# Patient Record
Sex: MALE | Race: WHITE
[De-identification: names, ages, dates, MRNs, and addresses within clinical notes are randomized per-mention and may not be internally consistent; named-entity substitution may affect disease eponyms.]

---

## 2019-04-18 ENCOUNTER — HOSPITAL ENCOUNTER (EMERGENCY)
Dept: HOSPITAL 80 - CED | Age: 34
Discharge: HOME | End: 2019-04-18
Payer: COMMERCIAL

## 2019-04-18 VITALS — DIASTOLIC BLOOD PRESSURE: 84 MMHG | SYSTOLIC BLOOD PRESSURE: 120 MMHG

## 2019-04-18 DIAGNOSIS — R10.12: Primary | ICD-10-CM

## 2019-04-18 NOTE — EDPHY
H & P


Stated Complaint: LUQ abd pain since yesterday, worse today


Time Seen by Provider: 04/18/19 08:44


HPI/ROS: 





CHIEF COMPLAINT:  Left upper quadrant pain





HISTORY OF PRESENT ILLNESS:  Patient is a 34-year-old smoker who comes to the 

emergency department complaining of left upper abdominal pain that began 

yesterday.  He states that it is worse when he eats or burps.  He has also 

noticed some pain with deep inspiration.  He does not feel short of breath.  He 

denies chest pain.  No recent fevers or illness.  No recent travel.  No leg 

swelling or pain.  He does have a history of left inguinal hernia repair as a 

child but has not had any other abdominal surgeries.  He had a bowel movement 

yesterday and today they were normal in its not feel constipated.  No vomiting.

  No diarrhea.  No blood in his stool.  He tried taking Tums yesterday without 

significant relief.  No fever.


Severity:  Moderate


Modifying factors:  See above





REVIEW OF SYSTEMS:


Constitutional:  denies: chills, fever, recent illness, recent injury


EENTM: denies: blurred vision, double vision, nose congestion


Respiratory: denies: cough, shortness of breath


Cardiac: denies: chest pain, irregular heart rate, lightheadedness, palpitations


Gastrointestinal/Abdominal:  See HPI


Genitourinary: denies: dysuria, frequency, hematuria, pain


Musculoskeletal: denies: joint pain, muscle pain


Skin: denies: lesions, rash, jaundice, bruising


Neurological: denies: headache, numbness, paresthesia, tingling, dizziness, 

weakness


Hematologic/Lymphatic: denies: blood clots, easy bleeding, easy bruising


Immunologic/allergic: denies: HIV/AIDS, transplant


 10 systems reviewed and negative except as noted





EXAM:


GENERAL:  Well-appearing, well-nourished and in no acute distress.


HEAD:  Atraumatic, normocephalic.


EYES:  Pupils equal round and reactive to light, extraocular movements intact, 

sclera anicteric, conjunctiva are normal.


ENT:  TMs normal, nares patent, oropharynx clear without exudates.  Moist 

mucous membranes.


NECK:  Normal range of motion, supple without lymphadenopathy or JVD.


LUNGS:  Breath sounds clear to auscultation bilaterally and equal.  No wheezes 

rales or rhonchi.


HEART:  Regular rate and rhythm without murmurs, rubs or gallops.


ABDOMEN:  Soft, nontender, normoactive bowel sounds.  No guarding, no rebound.  

No masses appreciated. 


BACK:  No CVA tenderness, no spinal tenderness, step-offs or deformities


EXTREMITIES:  Normal range of motion, no pitting or edema.  No clubbing or 

cyanosis.


NEUROLOGICAL:  Cranial nerves II through XII grossly intact.  Normal speech, 

normal gait.  5/5 strength, normal movement in all extremities, normal sensation

, normal reflexes


PSYCH:  Normal mood, normal affect.


SKIN:  Warm, dry, normal turgor, no visible rashes or lesions.








Source: Patient


Exam Limitations: No limitations





- Personal History


Current Tetanus Diphtheria and Acellular Pertussis (TDAP): Unsure





- Medical/Surgical History


Hx Asthma: No


Hx Chronic Respiratory Disease: No


Hx Diabetes: No


Hx Cardiac Disease: No


Hx Renal Disease: No


Hx Cirrhosis: No


Hx Alcoholism: No


Hx HIV/AIDS: No


Hx Splenectomy or Spleen Trauma: No


Other PMH: hernia surg





- Family History


Significant Family History: No pertinent family hx





- Social History


Smoking Status: Current every day smoker


Alcohol Use: Sober


Constitutional: 


 Initial Vital Signs











Temperature (C)  37.2 C   04/18/19 08:45


 


Heart Rate  71   04/18/19 08:45


 


Respiratory Rate  16   04/18/19 08:45


 


Blood Pressure  131/83 H  04/18/19 08:45


 


O2 Sat (%)  95   04/18/19 08:45








 











O2 Delivery Mode               Room Air














Allergies/Adverse Reactions: 


 





erythromycin base Allergy (Verified 04/18/19 08:58)


 








Home Medications: 














 Medication  Instructions  Recorded


 


Concerta  04/18/19


 


Famotidine [Pepcid] 40 mg PO HS #30 tablet 04/18/19


 


PARoxetine HCL  04/18/19














Medical Decision Making





- Diagnostics


EKG Interpretation: 





An EKG obtained and was read and documented in trace view.  Please see trace 

view for full reading and report.  Sinus rhythm, incomplete right bundle 

consistent with thin chest wall.  No acute ischemic changes  


Imaging Results: 


 Imaging Impressions





Chest X-Ray  04/18/19 08:50


Impression: Clear lungs. No acute process.











Imaging: Discussed imaging studies w/ On call Radiologist


ED Course/Re-evaluation: 





The patient's EKG, troponin, D-dimer, LFTs and lab workup reassuring.  He 

initially declined x-ray imaging but is now consenting.  Symptoms have improved.





9:45 a.m..  Patient and wife feel reassured.  Will start on Pepcid and have 

follow-up with GI possibly for upper endoscopy.  Discussed indications for 

return to the ER.  Abdominal exam remains benign.


Differential Diagnosis: 





Partial list of the Differential diagnosis considered include but were not 

limited to;  gastroenteritis, peptic ulcer disease and although unlikely based 

on the history and physical exam, I also considered PE, acute coronary disease, 

pneumonia, trauma, appendicitis, biliary disease.  I discussed these 

differential diagnoses and the plan with the patient as well as the usual and 

expected course.  The patient understands that the diagnosis is provisional and 

that in medicine we are not always correct and that further workup is often 

warranted.  Usual and customary warnings were given.  All of the patient's 

questions were answered.  The patient was instructed to return to the emergency 

department should the symptoms at all worsen or return, otherwise to followup 

with the physician as we discussed.





- Data Points


Laboratory Results: 


 











  04/18/19 04/18/19





  09:07 09:05


 


POC Sodium  142 mEq/L mEq/L  





   (135-145)  


 


POC Potassium  4.0 mEq/L mEq/L  





   (3.3-5.0)  


 


POC Chloride  102.0 mEq/L mEq/L  





   ()  


 


POC Total CO2  29 mEq/L mEq/L  





   (22-31)  


 


POC BUN  12 mg/dL mg/dL  





   (7-23)  


 


POC Creatinine  0.6 mg/dL L mg/dL  





   (0.7-1.3)  


 


POC Glucose  97 mg/dL mg/dL  





   ()  


 


POC Calcium  10.1 mg/dL mg/dL  





   (8.5-10.4)  


 


POC Total Bilirubin  1.1 mg/dL mg/dL  





   (0.1-1.4)  


 


POC AST  36 IU/L IU/L  





   (17-59)  


 


POC ALT  43 IU/L IU/L  





   (21-72)  


 


POC Alk Phosphatase  74 IU/L IU/L  





   ()  


 


POC Troponin I    0.00 ng/mL ng/mL





    (0.00-0.08) 


 


POC Total Protein  7.8 g/dL g/dL  





   (6.3-8.2)  


 


POC Albumin  4.4 g/dL g/dL  





   (3.5-5.0)  











Medications Given: 


 








Discontinued Medications





Al Hydroxide/Mg Hydroxide (Maalox Susp)  30 ml PO ONCE ONE


   Stop: 04/18/19 08:51


   Last Admin: 04/18/19 09:08 Dose:  30 ml


Hyoscyamine Sulfate (Levsin, Hyomax-Sl)  0.25 mg PO ONCE ONE


   Stop: 04/18/19 08:51


   Last Admin: 04/18/19 09:08 Dose:  0.25 mg


Lidocaine (Lidocaine 2% Viscous)  15 ml PO ONCE ONE


   Stop: 04/18/19 08:51


   Last Admin: 04/18/19 09:08 Dose:  15 ml





Point of Care Test Results: 


 CBC











CBC Collection Date            04/18/19


 


CBC Collection Time            09:00


 


WBC                            9.6


 


RBC                            5.35


 


HGB                            15.9


 


HCT                            46.6


 


PLT                            193


 


Neut #                         6.68


 


Neut                           69.6


 


LYMPH #                        1.81


 


LYMPH                          18.9


 


MCV                            87.1











 Chemistry











  04/18/19 04/18/19





  09:07 09:05


 


POC Sodium  142 mEq/L mEq/L  





   (135-145)  


 


POC Potassium  4.0 mEq/L mEq/L  





   (3.3-5.0)  


 


POC Chloride  102.0 mEq/L mEq/L  





   ()  


 


POC Total CO2  29 mEq/L mEq/L  





   (22-31)  


 


POC BUN  12 mg/dL mg/dL  





   (7-23)  


 


POC Creatinine  0.6 mg/dL L mg/dL  





   (0.7-1.3)  


 


POC Glucose  97 mg/dL mg/dL  





   ()  


 


POC Calcium  10.1 mg/dL mg/dL  





   (8.5-10.4)  


 


POC Total Bilirubin  1.1 mg/dL mg/dL  





   (0.1-1.4)  


 


POC AST  36 IU/L IU/L  





   (17-59)  


 


POC ALT  43 IU/L IU/L  





   (21-72)  


 


POC Alk Phosphatase  74 IU/L IU/L  





   ()  


 


POC Troponin I    0.00 ng/mL ng/mL





    (0.00-0.08) 


 


POC Total Protein  7.8 g/dL g/dL  





   (6.3-8.2)  


 


POC Albumin  4.4 g/dL g/dL  





   (3.5-5.0)  








 D-Dimer











D-Dimer Collection Date        04/18/19


 


D-Dimer Collection Time        08:55


 


D-Dimer (ng/ml)                <100

















Departure





- Departure


Disposition: Home, Routine, Self-Care


Clinical Impression: 


 Left upper quadrant pain





Condition: Fair


Instructions:  Peptic Ulcer (ED), Acute Abdominal Pain (ED)


Referrals: 


Nathen Cuellar DO [Primary Care Provider] - As per Instructions


Savage Mcdowell MD, FACG [Medical Doctor] - 5-7 days, call for appt.


Prescriptions: 


Famotidine [Pepcid] 40 mg PO HS #30 tablet

## 2019-04-18 NOTE — CPEKG
Test Reason : OPEN

Blood Pressure : ***/*** mmHG

Vent. Rate : 070 BPM     Atrial Rate : 070 BPM

   P-R Int : 148 ms          QRS Dur : 116 ms

    QT Int : 395 ms       P-R-T Axes : 015 076 053 degrees

   QTc Int : 427 ms

 

Sinus rhythm

Incomplete right bundle branch block

 

 

Confirmed by Juan Barton ([id number]) on 4/18/2019 9:29:33 AM

 

Referred By: Juan Barton           Confirmed By:Juan Batron